# Patient Record
Sex: FEMALE | Race: WHITE | NOT HISPANIC OR LATINO
[De-identification: names, ages, dates, MRNs, and addresses within clinical notes are randomized per-mention and may not be internally consistent; named-entity substitution may affect disease eponyms.]

---

## 2020-11-11 ENCOUNTER — RESULT REVIEW (OUTPATIENT)
Age: 75
End: 2020-11-11

## 2022-10-27 ENCOUNTER — OUTPATIENT (OUTPATIENT)
Dept: OUTPATIENT SERVICES | Facility: HOSPITAL | Age: 77
LOS: 1 days | End: 2022-10-27

## 2022-10-27 ENCOUNTER — APPOINTMENT (OUTPATIENT)
Dept: ULTRASOUND IMAGING | Facility: CLINIC | Age: 77
End: 2022-10-27

## 2022-10-27 PROCEDURE — 76856 US EXAM PELVIC COMPLETE: CPT | Mod: 26

## 2022-10-27 PROCEDURE — 76830 TRANSVAGINAL US NON-OB: CPT | Mod: 26

## 2022-10-28 ENCOUNTER — TRANSCRIPTION ENCOUNTER (OUTPATIENT)
Age: 77
End: 2022-10-28

## 2022-10-28 PROBLEM — Z00.00 ENCOUNTER FOR PREVENTIVE HEALTH EXAMINATION: Status: ACTIVE | Noted: 2022-10-28

## 2022-11-03 ENCOUNTER — OUTPATIENT (OUTPATIENT)
Dept: OUTPATIENT SERVICES | Facility: HOSPITAL | Age: 77
LOS: 1 days | End: 2022-11-03

## 2022-11-03 ENCOUNTER — APPOINTMENT (OUTPATIENT)
Dept: CT IMAGING | Facility: CLINIC | Age: 77
End: 2022-11-03

## 2022-11-03 PROCEDURE — 74178 CT ABD&PLV WO CNTR FLWD CNTR: CPT | Mod: 26

## 2022-11-10 ENCOUNTER — LABORATORY RESULT (OUTPATIENT)
Age: 77
End: 2022-11-10

## 2022-11-10 ENCOUNTER — APPOINTMENT (OUTPATIENT)
Dept: UROLOGY | Facility: CLINIC | Age: 77
End: 2022-11-10

## 2022-11-10 VITALS
HEIGHT: 65.5 IN | TEMPERATURE: 97.6 F | WEIGHT: 189 LBS | OXYGEN SATURATION: 96 % | SYSTOLIC BLOOD PRESSURE: 123 MMHG | DIASTOLIC BLOOD PRESSURE: 77 MMHG | HEART RATE: 83 BPM | BODY MASS INDEX: 31.11 KG/M2

## 2022-11-10 DIAGNOSIS — R31.0 GROSS HEMATURIA: ICD-10-CM

## 2022-11-10 LAB
BILIRUB UR QL STRIP: NORMAL
CLARITY UR: CLEAR
COLLECTION METHOD: NORMAL
GLUCOSE UR-MCNC: NORMAL
HCG UR QL: 0.2 EU/DL
HGB UR QL STRIP.AUTO: NORMAL
KETONES UR-MCNC: NORMAL
LEUKOCYTE ESTERASE UR QL STRIP: NORMAL
NITRITE UR QL STRIP: NORMAL
PH UR STRIP: 6
PROT UR STRIP-MCNC: NORMAL
SP GR UR STRIP: 1.01

## 2022-11-10 PROCEDURE — 52000 CYSTOURETHROSCOPY: CPT

## 2022-11-10 PROCEDURE — 81003 URINALYSIS AUTO W/O SCOPE: CPT | Mod: QW

## 2022-11-10 PROCEDURE — 99205 OFFICE O/P NEW HI 60 MIN: CPT | Mod: 25

## 2022-11-14 LAB
ABO + RH PNL BLD: NORMAL
ALBUMIN SERPL ELPH-MCNC: 4.6 G/DL
ALP BLD-CCNC: 65 U/L
ALT SERPL-CCNC: 16 U/L
ANION GAP SERPL CALC-SCNC: 14 MMOL/L
AST SERPL-CCNC: 24 U/L
BACTERIA UR CULT: NORMAL
BASOPHILS # BLD AUTO: 0 K/UL
BASOPHILS NFR BLD AUTO: 0 %
BILIRUB SERPL-MCNC: 0.3 MG/DL
BUN SERPL-MCNC: 17 MG/DL
CALCIUM SERPL-MCNC: 9.7 MG/DL
CHLORIDE SERPL-SCNC: 104 MMOL/L
CO2 SERPL-SCNC: 24 MMOL/L
CREAT SERPL-MCNC: 0.76 MG/DL
EGFR: 81 ML/MIN/1.73M2
EOSINOPHIL # BLD AUTO: 0 K/UL
EOSINOPHIL NFR BLD AUTO: 0 %
GLUCOSE SERPL-MCNC: 93 MG/DL
HCT VFR BLD CALC: 41.2 %
HGB BLD-MCNC: 13.4 G/DL
INR PPP: 1.02 RATIO
LYMPHOCYTES # BLD AUTO: 2.5 K/UL
LYMPHOCYTES NFR BLD AUTO: 26.5 %
MAN DIFF?: NORMAL
MCHC RBC-ENTMCNC: 32.5 GM/DL
MCHC RBC-ENTMCNC: 35.4 PG
MCV RBC AUTO: 108.7 FL
MONOCYTES # BLD AUTO: 0.67 K/UL
MONOCYTES NFR BLD AUTO: 7.1 %
NEUTROPHILS # BLD AUTO: 6.27 K/UL
NEUTROPHILS NFR BLD AUTO: 66.4 %
PLATELET # BLD AUTO: 186 K/UL
POTASSIUM SERPL-SCNC: 4.3 MMOL/L
PROT SERPL-MCNC: 7.3 G/DL
PT BLD: 11.9 SEC
RBC # BLD: 3.79 M/UL
RBC # FLD: 14 %
SARS-COV-2 N GENE NPH QL NAA+PROBE: NOT DETECTED
SODIUM SERPL-SCNC: 142 MMOL/L
URINE CYTOLOGY: NORMAL
WBC # FLD AUTO: 9.45 K/UL

## 2022-11-14 NOTE — HISTORY OF PRESENT ILLNESS
[FreeTextEntry1] : Language: English\par Date of First visit: 11/10/2022\par Accompanied by: Daughter Abby\par Contact info: ***\par Referring Provider/PCP: Daniella Phillips\par \par \par \par CC/ Problem List:\par \par ===============================================================================\par FIRST VISIT:\par The patient is a 77 year female who first presents 11/10/2022 for a bladder mass and hematuria.\par \par She was having cramping around Oct 2022. She had a vaginal sonogram. There was nothing there. they could not  see her right ovary. She has had persistent gross hematuria without infection. Her urine is usually light pink. She had a CT scan on 2022. She had a bladder mass. She has some pain when she voids. She denies abnormal weight loss.\par \par She is an active. She has no h/o chemo, RT, schisto exposure, narcotic abuse, chemical exposure at work recently though when she was in the [employer] in [facility] she was exposed to DDT. \par \par She denies h/o  trauma or surgery. She is in an [facility] Lung screening study because she is a smoker\par \par ** Because of her CT findings she was added on for an UNSCHEDULED CYSTOSCOPY.**\par \par -------------------------------------------------------------------------------------------\par INTERVAL VISITS:\par \par \par ===============================================================================\par \par PMH: HLD, Right breast pre-cancer cells, coronary artery calcifications\par PSH: appy, Breast surgery\par POBH: (if applicable) \par FH: Daugter Ovarian CA, Aunt Breast CA\par \par ALL: NKDA\par MEDS: Rosuvastatin, Vit C, Vit D3, Fishoil, Oil of Oregano, ERENS -2 for eyes, Turmeric, CoQ10, B complex, Mineral complex\par SOC: Active smoker since age 25, Social ETOH, No drugs\par \par \par ROS: Review of Systems is as per HPI unless otherwise denoted below\par \par \par ===============================================================================\par DATA: \par \par LABS:-------------------------------------------------------------------------------------------------------------------\par 10/26/2022: Urine culture negative\par 10/25/2022: UA 4 WBC, 2 RBC, 1 Epi, ) Casts\par 11/10/2022: UA negative except moderate Blood\par \par \par RADS:-------------------------------------------------------------------------------------------------------------------\par 2022: CTU\par Large left lateral bladder mucosal neoplasm. She has no hydro. \par Films personally reviewed. Her Left ureter goes straight into the bladder mass It is not far from her right UO and there may be some borderline thickening in the area.\par Films reviewed with family.\par \par Right adnexal cyst for which sonographic follow-up in 6-12 months can be \par performed, as per consensus criteria.\par \par \par \par PATHOLOGY/CYTOLOGY:-------------------------------------------------------------------------------------------\par \par \par \par VOIDING STUDIES: ----------------------------------------------------------------------------------------------------\par \par \par \par STONE STUDIES: (Analysis/LLSA)----------------------------------------------------------------------------------\par \par \par \par PROCEDURES: -----------------------------------------------------------------------------------------------\par 11/10/2022: Cystoscopy:\par Her urethra was normal. She had a large solid highly abnormal mass on her left trigone covering her left UO. I could not clearly see her right UO due to bullous changes. It extends up the lateral wall and is >5cm.\par \par \par \par ===============================================================================\par \par PHYSICAL EXAM:\par \par GEN: AAOx3, NAD, Habitus: BMI 31\par \par BARRIERS to CARE: None\par \par PSYCH: Appropriate Behavior, Affect Congruent\par \par HEENT: AT/NC Trachea midline. EOMI.\par \par Lungs: No labored breathing.\par \par NEURO: + Movement, all 4 extremities grossly intact without deficits. No tremors.\par \par SKIN: Warm dry. No visible rashes or ulcers\par \par GAIT: Gait normal, Stability good\par \par  FOCUSED: -----------------------------------------------------------------------------------------------\par \par Bladder: Nondistended, Nontender, No masses\par \par Ext Genitalia: Normal\par \par Urethra: Normal, no masses, no scarring or prolapse; No hypermobility\par \par =======================================================================================\par \par \par \par ASSESSMENT and PLAN\par \par The patient is a 77 year female with a history of the following:\par \par 1. Bladder mass with hematuria:\par \par She is s/p CT that showed a large bladder mass that involves her left UO and possibly her right UO.\par Her cysto today 11/10/2022 confirmed this mass.\par \par Given the patient's cystoscopic findings, the patient was offered a Transurethral resection of bladder tumor. I explained the reasoning behind this.\par \par I explained that the procedure is done cystoscopically and without incisions. During the procedure we attempt to remove as much tumor as possible, and hopefully all of it. We do this as for diagnostic, staging and therapeutic reasons.\par \par If we need to resect the bladder in its full thickness, we may need to leave the patient with a catheter for a few days.\par \par The patient's tumor does involve the ureteral orifices and as such the patient does need ureteral stents. \par The patient's tumor does not involve significant portions of the bladder neck which may necessitate Peters catheterization after the procedure.\par The patient's tumor does involve the lateral walls and therefore the patient likely will need to be paralyzed during this procedure to allow for full resection without an obturator reflex.\par Because the patient is a female and likely has a thin bladder, she may need a peters after this procedure to allow the bladder to heal.\par \par I explained the risks of this procedure which include but are not limited to bleeding, infection, perforation, damage to the urinary tract, damage to the ureteral orifices and incontinence. If there is a perforation at the dome, the patient may need an emergent laparotomy.\par \par Given the patient's current clinic situation, the patient does not need intravesical therapy immediately after the procedure. \par \par We also discussed the natural history of this disease:\par Low grade cancers don't usually progress but certainly recur while high grade cancers can progress and often recur as well. Recurrences of bladder cancer can occur anywhere in the urethra, bladder, ureters or renal pelvis. As such, surveillance for bladder cancer patients is lifelong and involves upper and lower tract imaging/cystoscopies.\par \par The patient's questions were answered and the patient opted to book surgery. I also spoke to her PCP: she may be able to get cleared tomorrow. The pt will call her and we will draw her blood work.\par \par \par -----------------------------------------------------------------------------------------------------\par LABS/TESTS Ordered: CMP, CBC, PT/INR\par Meds Ordered:\par Follow up: Surgery\par -----------------------------------------------------------------------------------------------------\par \par The total amount of time I have personally spent preparing for this visit, reviewing the patient's test results, obtaining external history, ordering tests/medications, documenting clinical information, communicating with and counseling the patient/family and/or caregiver(s), and spent face to face with the patient explaining the above was  75 minutes.\par \par Thank you for allowing me to assist in the care of your patient. Should you have any questions please do not hesitate to reach out to me.\par \par \par Debra Sheppard MD\par Associate \par Department of Urology\par [facility]\par Phone: [phone]\par Fax: [phone]\par \par [address] [place] [address]\par [facility] [address]\par

## 2022-11-14 NOTE — ADDENDUM
[FreeTextEntry1] : 11/10/2022: Cytology atypical. All other labs normal with ; urine culture negative

## 2022-11-15 DIAGNOSIS — Z01.818 ENCOUNTER FOR OTHER PREPROCEDURAL EXAMINATION: ICD-10-CM

## 2022-11-16 LAB — SARS-COV-2 N GENE NPH QL NAA+PROBE: NOT DETECTED

## 2022-11-17 VITALS
WEIGHT: 189.16 LBS | HEART RATE: 81 BPM | SYSTOLIC BLOOD PRESSURE: 126 MMHG | HEIGHT: 65 IN | TEMPERATURE: 98 F | DIASTOLIC BLOOD PRESSURE: 70 MMHG | RESPIRATION RATE: 16 BRPM | OXYGEN SATURATION: 96 %

## 2022-11-17 NOTE — H&P ADULT - HISTORY OF PRESENT ILLNESS
77F with PMH of HLD, right breast pre-cancer cells, CAD, and hematuria, was found to have a >5cm left lateral bladder mass on her left trigone covering left UO and presents for elective TURBT. Complains of dysuria, and persistent gross hematuria without infection. Current smoker since age 25.    PMH: HLD, Right breast pre-cancer cells, coronary artery calcifications  PSH: appy, Breast surgery  POBH: (if applicable)   FH: Daugter Ovarian CA, Aunt Breast CA    ALL: NKDA  MEDS: Rosuvastatin, Vit C, Vit D3, Fishoil, Oil of Oregano, ERENS -2 for eyes, Turmeric, CoQ10, B complex, Mineral complex  SOC: Active smoker since age 25, Social ETOH, No drugs

## 2022-11-17 NOTE — ASU PATIENT PROFILE, ADULT - NSICDXPASTMEDICALHX_GEN_ALL_CORE_FT
PAST MEDICAL HISTORY:  HLD (hyperlipidemia)     Kidney cysts     Lung nodules left    JOSE C treated with BiPAP

## 2022-11-17 NOTE — ASU PATIENT PROFILE, ADULT - NSCAFFEINETYPE_GEN_ALL_CORE_SD
Identified pt with two pt identifiers(name and ). Chief Complaint   Patient presents with    New Patient     est care    Cyst     back of throat- painful if touched x1 month at least    Pain (Chronic)     help with inflamation    GERD     substitute to jeannine        Health Maintenance Due   Topic    Hepatitis C Screening     DTaP/Tdap/Td series (1 - Tdap)    Shingrix Vaccine Age 50> (1 of 2)    Bone Densitometry (Dexa) Screening     Pneumococcal 65+ years (1 of 1 - PPSV23)    Medicare Yearly Exam        Wt Readings from Last 3 Encounters:   06/15/21 227 lb (103 kg)   21 220 lb (99.8 kg)   10/28/20 224 lb (101.6 kg)     Temp Readings from Last 3 Encounters:   06/15/21 97.4 °F (36.3 °C) (Oral)   21 97.1 °F (36.2 °C)   20 97.6 °F (36.4 °C)     BP Readings from Last 3 Encounters:   06/15/21 126/72   21 118/87   10/28/20 122/84     Pulse Readings from Last 3 Encounters:   06/15/21 (!) 59   21 91   10/28/20 64         Learning Assessment:  :     Learning Assessment 6/15/2021   PRIMARY LEARNER Patient   HIGHEST LEVEL OF EDUCATION - PRIMARY LEARNER  SOME COLLEGE   BARRIERS PRIMARY LEARNER NONE   CO-LEARNER CAREGIVER No   PRIMARY LANGUAGE ENGLISH   LEARNER PREFERENCE PRIMARY DEMONSTRATION   ANSWERED BY patient   RELATIONSHIP SELF       Depression Screening:  :     3 most recent PHQ Screens 6/15/2021   Little interest or pleasure in doing things Several days   Feeling down, depressed, irritable, or hopeless Several days   Total Score PHQ 2 2       Fall Risk Assessment:  :     Fall Risk Assessment, last 12 mths 6/15/2021   Able to walk? Yes   Fall in past 12 months? 0   Do you feel unsteady? 1   Are you worried about falling 0   Is the gait abnormal? 0       Abuse Screening:  :     Abuse Screening Questionnaire 6/15/2021   Do you ever feel afraid of your partner? N   Are you in a relationship with someone who physically or mentally threatens you? N   Is it safe for you to go home?  Amaris Holley Coordination of Care Questionnaire:  :     1) Have you been to an emergency room, urgent care clinic since your last visit? yes [facility] ER 3/8/21  Hospitalized since your last visit? no             2) Have you seen or consulted any other health care providers outside of [address] [place] [address] since your last visit? yes  Dr Hermelindo Montiel- old PCP 2/2021    3) Do you have an Advance Directive on file? no  Are you interested in receiving information about Advance Directives? no    Reviewed record in preparation for visit and have obtained necessary documentation. coffee/tea

## 2022-11-17 NOTE — H&P ADULT - NSICDXPASTSURGICALHX_GEN_ALL_CORE_FT
PAST SURGICAL HISTORY:  H/O breast surgery right    S/P appendectomy     S/P colonoscopic polypectomy

## 2022-11-17 NOTE — H&P ADULT - ASSESSMENT
77F with PMH of HLD, right breast pre-cancer cells, CAD, and hematuria, was found to have a >5cm left lateral bladder mass on her left trigone covering left UO and presents for elective TURBT.    Plan:  NPO  OR

## 2022-11-17 NOTE — ASU PATIENT PROFILE, ADULT - NSICDXPASTSURGICALHX_GEN_ALL_CORE_FT
PAST SURGICAL HISTORY:  H/O breast surgery right    S/P appendectomy     S/P colonoscopic polypectomy      PAST SURGICAL HISTORY:  H/O breast surgery right- lumpectomy    S/P appendectomy     S/P colonoscopic polypectomy

## 2022-11-17 NOTE — ASU PATIENT PROFILE, ADULT - FALL HARM RISK - HARM RISK INTERVENTIONS

## 2022-11-18 ENCOUNTER — OUTPATIENT (OUTPATIENT)
Dept: OUTPATIENT SERVICES | Facility: HOSPITAL | Age: 77
LOS: 1 days | Discharge: HOME | End: 2022-11-18
Payer: MEDICARE

## 2022-11-18 ENCOUNTER — APPOINTMENT (OUTPATIENT)
Dept: UROLOGY | Facility: HOSPITAL | Age: 77
End: 2022-11-18

## 2022-11-18 ENCOUNTER — TRANSCRIPTION ENCOUNTER (OUTPATIENT)
Age: 77
End: 2022-11-18

## 2022-11-18 ENCOUNTER — RESULT REVIEW (OUTPATIENT)
Age: 77
End: 2022-11-18

## 2022-11-18 VITALS
SYSTOLIC BLOOD PRESSURE: 121 MMHG | HEART RATE: 81 BPM | RESPIRATION RATE: 20 BRPM | DIASTOLIC BLOOD PRESSURE: 79 MMHG | OXYGEN SATURATION: 95 %

## 2022-11-18 DIAGNOSIS — Z90.49 ACQUIRED ABSENCE OF OTHER SPECIFIED PARTS OF DIGESTIVE TRACT: Chronic | ICD-10-CM

## 2022-11-18 DIAGNOSIS — Z98.890 OTHER SPECIFIED POSTPROCEDURAL STATES: Chronic | ICD-10-CM

## 2022-11-18 PROBLEM — N28.1 CYST OF KIDNEY, ACQUIRED: Chronic | Status: ACTIVE | Noted: 2022-11-17

## 2022-11-18 PROBLEM — G47.33 OBSTRUCTIVE SLEEP APNEA (ADULT) (PEDIATRIC): Chronic | Status: ACTIVE | Noted: 2022-11-17

## 2022-11-18 PROBLEM — R91.8 OTHER NONSPECIFIC ABNORMAL FINDING OF LUNG FIELD: Chronic | Status: ACTIVE | Noted: 2022-11-17

## 2022-11-18 PROBLEM — E78.5 HYPERLIPIDEMIA, UNSPECIFIED: Chronic | Status: ACTIVE | Noted: 2022-11-17

## 2022-11-18 PROCEDURE — 88305 TISSUE EXAM BY PATHOLOGIST: CPT | Mod: 26

## 2022-11-18 PROCEDURE — 86901 BLOOD TYPING SEROLOGIC RH(D): CPT

## 2022-11-18 PROCEDURE — 88305 TISSUE EXAM BY PATHOLOGIST: CPT

## 2022-11-18 PROCEDURE — C1758: CPT

## 2022-11-18 PROCEDURE — 52240 CYSTOSCOPY AND TREATMENT: CPT

## 2022-11-18 PROCEDURE — 86850 RBC ANTIBODY SCREEN: CPT

## 2022-11-18 PROCEDURE — C9399: CPT

## 2022-11-18 PROCEDURE — C2617: CPT

## 2022-11-18 PROCEDURE — 52332 CYSTOSCOPY AND TREATMENT: CPT | Mod: LT

## 2022-11-18 PROCEDURE — 76000 FLUOROSCOPY <1 HR PHYS/QHP: CPT

## 2022-11-18 PROCEDURE — 86900 BLOOD TYPING SEROLOGIC ABO: CPT

## 2022-11-18 DEVICE — URETERAL CATH OPEN END 5FR 70CM
Type: IMPLANTABLE DEVICE | Status: NON-FUNCTIONAL
Removed: 2022-11-18

## 2022-11-18 DEVICE — URETERAL STENT CONTOUR 6FR 24CM
Type: IMPLANTABLE DEVICE | Status: NON-FUNCTIONAL
Removed: 2022-11-18

## 2022-11-18 RX ORDER — TAMSULOSIN HYDROCHLORIDE 0.4 MG/1
1 CAPSULE ORAL
Qty: 30 | Refills: 0
Start: 2022-11-18 | End: 2022-12-17

## 2022-11-18 RX ORDER — ONDANSETRON 8 MG/1
4 TABLET, FILM COATED ORAL ONCE
Refills: 0 | Status: DISCONTINUED | OUTPATIENT
Start: 2022-11-18 | End: 2022-11-18

## 2022-11-18 RX ORDER — ROSUVASTATIN CALCIUM 5 MG/1
1 TABLET ORAL
Qty: 0 | Refills: 0 | DISCHARGE

## 2022-11-18 RX ORDER — ACETAMINOPHEN 500 MG
1000 TABLET ORAL ONCE
Refills: 0 | Status: COMPLETED | OUTPATIENT
Start: 2022-11-18 | End: 2022-11-18

## 2022-11-18 RX ORDER — KETOROLAC TROMETHAMINE 30 MG/ML
15 SYRINGE (ML) INJECTION ONCE
Refills: 0 | Status: DISCONTINUED | OUTPATIENT
Start: 2022-11-18 | End: 2022-11-18

## 2022-11-18 RX ORDER — PHENAZOPYRIDINE HCL 100 MG
1 TABLET ORAL
Qty: 9 | Refills: 0
Start: 2022-11-18 | End: 2022-11-20

## 2022-11-18 RX ORDER — SODIUM CHLORIDE 9 MG/ML
1000 INJECTION, SOLUTION INTRAVENOUS
Refills: 0 | Status: DISCONTINUED | OUTPATIENT
Start: 2022-11-18 | End: 2022-11-18

## 2022-11-18 RX ORDER — PHENAZOPYRIDINE HCL 100 MG
100 TABLET ORAL ONCE
Refills: 0 | Status: DISCONTINUED | OUTPATIENT
Start: 2022-11-18 | End: 2022-11-18

## 2022-11-18 RX ORDER — TAMSULOSIN HYDROCHLORIDE 0.4 MG/1
1 CAPSULE ORAL
Qty: 14 | Refills: 0
Start: 2022-11-18 | End: 2022-12-01

## 2022-11-18 RX ADMIN — Medication 400 MILLIGRAM(S): at 10:56

## 2022-11-18 RX ADMIN — Medication 1000 MILLIGRAM(S): at 11:15

## 2022-11-18 RX ADMIN — Medication 15 MILLIGRAM(S): at 11:15

## 2022-11-18 RX ADMIN — Medication 15 MILLIGRAM(S): at 10:56

## 2022-11-18 NOTE — ASU DISCHARGE PLAN (ADULT/PEDIATRIC) - CARE PROVIDER_API CALL
Debra Sheppard)  Urology  [address] [place] [address]  Phone: [phone]  Fax: [phone]  Scheduled Appointment: 11/22/2022

## 2022-11-18 NOTE — BRIEF OPERATIVE NOTE - NSICDXBRIEFPROCEDURE_GEN_ALL_CORE_FT
PROCEDURES:  Excision, tumor, bladder 18-Nov-2022 10:27:04  Debra Sheppard  Insertion, stent, ureter, cystoscopic 18-Nov-2022 11:21:49  Debra Sheppard

## 2022-11-18 NOTE — ASU DISCHARGE PLAN (ADULT/PEDIATRIC) - NS MD DC FALL RISK RISK
For information on Fall & Injury Prevention, visit: [email or web address] OR  [email or web address] OR  https://www.cdc.gov/steadi/patient.html

## 2022-11-18 NOTE — BRIEF OPERATIVE NOTE - OPERATION/FINDINGS
Large solid, partially necrotic bladder tumor >6cm along the left lateral wall and trigone up to the left UO

## 2022-11-22 ENCOUNTER — APPOINTMENT (OUTPATIENT)
Dept: UROLOGY | Facility: CLINIC | Age: 77
End: 2022-11-22

## 2022-11-22 VITALS
DIASTOLIC BLOOD PRESSURE: 86 MMHG | TEMPERATURE: 97.8 F | HEART RATE: 95 BPM | SYSTOLIC BLOOD PRESSURE: 128 MMHG | OXYGEN SATURATION: 95 %

## 2022-11-22 LAB — SURGICAL PATHOLOGY STUDY: SIGNIFICANT CHANGE UP

## 2022-11-22 PROCEDURE — 99213 OFFICE O/P EST LOW 20 MIN: CPT

## 2022-11-22 NOTE — HISTORY OF PRESENT ILLNESS
[FreeTextEntry1] : Language: English\par Date of First visit: 11/10/2022\par Accompanied by: Daughter Abby\par Contact info: ***\par Referring Provider/PCP: \paddy Coley\par [phone]\par \par Daniella Wolfe\par [phone]\par \par \par \par CC/ Problem List:\par \par ===============================================================================\par FIRST VISIT:\par The patient was a 77 year female who first presented on 11/10/2022 for a bladder mass and hematuria.\par \par She was having cramping around Oct 2022. She had a vaginal sonogram. There was nothing there. they could not  see her right ovary. She has had persistent gross hematuria without infection. Her urine is usually light pink. She had a CT scan on 2022. She had a bladder mass. She has some pain when she voids. She denies abnormal weight loss.\par \par She is an active. She has no h/o chemo, RT, schisto exposure, narcotic abuse, chemical exposure at work recently though when she was in the [employer] in [facility] she was exposed to DDT. \par \par She denies h/o  trauma or surgery. She is in an [facility] Lung screening study because she is a smoker\par \par ** Because of her CT findings she was added on for an UNSCHEDULED CYSTOSCOPY.**\par \par -------------------------------------------------------------------------------------------\par INTERVAL VISITS:\par \par On 2022 she underwent a very large TURBT with left ureteral stent.\par She tolerated the procedure well but I am worried her disease is muscle invasive.\par The patient's age today 2022 is 77 year old.\par Please note interval events and changes in PMH, PSH, MEDS and ALLERGIES were reviewed.\par She presents today 2022 for a TOV.\par She has been doing well. Her urine has been dark yellow.\par \par \par ===============================================================================\par \par PMH: HLD, Right breast pre-cancer cells, coronary artery calcifications\par PSH: appy, Breast surgery\par POBH: (if applicable) \par FH: Daugter Ovarian CA, Aunt Breast CA\par \par ALL: NKDA\par MEDS: Rosuvastatin, Vit C, Vit D3, Fishoil, Oil of Oregano, ERENS -2 for eyes, Turmeric, CoQ10, B complex, Mineral complex\par SOC: Active smoker since age 25, Social ETOH, No drugs\par \par \par ROS: Review of Systems is as per HPI unless otherwise denoted below\par \par \par ===============================================================================\par DATA: \par \par LABS:-------------------------------------------------------------------------------------------------------------------\par 10/26/2022: Urine culture negative\par 10/25/2022: UA 4 WBC, 2 RBC, 1 Epi, ) Casts\par 11/10/2022: UA negative except moderate Blood\par \par \par RADS:-------------------------------------------------------------------------------------------------------------------\par 2022: CTU\par Large left lateral bladder mucosal neoplasm. She has no hydro. \par Films personally reviewed. Her Left ureter goes straight into the bladder mass It is not far from her right UO and there may be some borderline thickening in the area.\par Films reviewed with family.\par \par Right adnexal cyst for which sonographic follow-up in 6-12 months can be \par performed, as per consensus criteria.\par \par \par \par PATHOLOGY/CYTOLOGY:-------------------------------------------------------------------------------------------\par \par \par \par VOIDING STUDIES: ----------------------------------------------------------------------------------------------------\par 2022: She passed her TOV today with a PVR 0cc\par She was given one dose Cefuroxime after\par \par \par STONE STUDIES: (Analysis/LLSA)----------------------------------------------------------------------------------\par \par \par \par PROCEDURES: -----------------------------------------------------------------------------------------------\par 11/10/2022: Cystoscopy:\par Her urethra was normal. She had a large solid highly abnormal mass on her left trigone covering her left UO. I could not clearly see her right UO due to bullous changes. It extends up the lateral wall and is >5cm.\par \par \par \par ===============================================================================\par \par PHYSICAL EXAM:\par \par GEN: AAOx3, NAD, Habitus: BMI 31\par \par BARRIERS to CARE: None\par \par PSYCH: Appropriate Behavior, Affect Congruent\par \par HEENT: AT/NC Trachea midline. EOMI.\par \par Lungs: No labored breathing.\par \par NEURO: + Movement, all 4 extremities grossly intact without deficits. No tremors.\par \par SKIN: Warm dry. No visible rashes or ulcers\par \par GAIT: Gait normal, Stability good\par \par =======================================================================================\par \par \par \par ASSESSMENT and PLAN\par \par The patient is a 77 year female with a history of the following:\par \par 1. Bladder mass with hematuria:\par \par She is s/p CT that showed a large bladder mass that involves her left UO and possibly her right UO.\par Her cysto 11/10/2022 confirmed this mass.\par On 2022 she underwent a very large TURBT with left ureteral stent.\par She passed her TOV today 2022 and was given Cefuroxime x1. \par \par She will RTC next Thursday for a stent removal and for a 30 minute visit to discuss her path.\par \par \par -----------------------------------------------------------------------------------------------------\par LABS/TESTS Ordered: \par Meds Ordered:\par Follow up: next Thursday, stent removal, 30 min visit to discuss path\par -----------------------------------------------------------------------------------------------------\par \par The total amount of time I have personally spent preparing for this visit, reviewing the patient's test results, obtaining external history, ordering tests/medications, documenting clinical information, communicating with and counseling the patient/family and/or caregiver(s), and spent face to face with the patient explaining the above was  25 minutes.\par \par Thank you for allowing me to assist in the care of your patient. Should you have any questions please do not hesitate to reach out to me.\par \par \par Debra Sheppard MD\[facility] Associate \[facility] Department of Urology\[facility] Phone: [phone]\[facility] Fax: [phone]\[facility] \[facility] [address] [address] [place]\[facility] [address]\[facility]

## 2022-11-29 ENCOUNTER — APPOINTMENT (OUTPATIENT)
Dept: UROLOGY | Facility: CLINIC | Age: 77
End: 2022-11-29

## 2022-11-29 VITALS
SYSTOLIC BLOOD PRESSURE: 134 MMHG | HEART RATE: 88 BPM | DIASTOLIC BLOOD PRESSURE: 83 MMHG | OXYGEN SATURATION: 94 % | TEMPERATURE: 97.4 F

## 2022-11-29 DIAGNOSIS — C67.8 MALIGNANT NEOPLASM OF OVERLAPPING SITES OF BLADDER: ICD-10-CM

## 2022-11-29 LAB
BILIRUB UR QL STRIP: NORMAL
CLARITY UR: CLEAR
COLLECTION METHOD: NORMAL
GLUCOSE UR-MCNC: NORMAL
HCG UR QL: 0.2 EU/DL
HGB UR QL STRIP.AUTO: NORMAL
KETONES UR-MCNC: NORMAL
LEUKOCYTE ESTERASE UR QL STRIP: NORMAL
NITRITE UR QL STRIP: NORMAL
PH UR STRIP: 5.5
PROT UR STRIP-MCNC: NORMAL
SP GR UR STRIP: 1.01

## 2022-11-29 PROCEDURE — 99215 OFFICE O/P EST HI 40 MIN: CPT | Mod: 25

## 2022-11-29 PROCEDURE — 52310 CYSTOSCOPY AND TREATMENT: CPT

## 2022-11-29 PROCEDURE — 81003 URINALYSIS AUTO W/O SCOPE: CPT | Mod: QW

## 2022-12-02 ENCOUNTER — OUTPATIENT (OUTPATIENT)
Dept: OUTPATIENT SERVICES | Facility: HOSPITAL | Age: 77
LOS: 1 days | End: 2022-12-02

## 2022-12-02 ENCOUNTER — APPOINTMENT (OUTPATIENT)
Dept: CT IMAGING | Facility: CLINIC | Age: 77
End: 2022-12-02

## 2022-12-02 ENCOUNTER — RESULT REVIEW (OUTPATIENT)
Age: 77
End: 2022-12-02

## 2022-12-02 DIAGNOSIS — Z98.890 OTHER SPECIFIED POSTPROCEDURAL STATES: Chronic | ICD-10-CM

## 2022-12-02 DIAGNOSIS — Z90.49 ACQUIRED ABSENCE OF OTHER SPECIFIED PARTS OF DIGESTIVE TRACT: Chronic | ICD-10-CM

## 2022-12-02 PROCEDURE — 71250 CT THORAX DX C-: CPT | Mod: 26

## 2022-12-20 ENCOUNTER — APPOINTMENT (OUTPATIENT)
Dept: UROLOGY | Facility: CLINIC | Age: 77
End: 2022-12-20

## 2022-12-20 VITALS
SYSTOLIC BLOOD PRESSURE: 111 MMHG | OXYGEN SATURATION: 97 % | TEMPERATURE: 97.5 F | HEART RATE: 93 BPM | DIASTOLIC BLOOD PRESSURE: 72 MMHG

## 2022-12-20 PROCEDURE — 99213 OFFICE O/P EST LOW 20 MIN: CPT

## 2022-12-20 NOTE — HISTORY OF PRESENT ILLNESS
[FreeTextEntry1] : Language: English\par Date of First visit: 11/10/2022\par Accompanied by: Daughter Abby\par Contact info: ***\par Referring Provider/PCP: \paddy Coley\par [phone]\par \par Daniella Wolfe\par [phone]\par \par \par \par \par CC/ Problem List:\par \par ===============================================================================\par FIRST VISIT:\par The patient was a 77 year female who first presented on 11/10/2022 for a bladder mass and hematuria.\par \par She was having cramping around Oct 2022. She had a vaginal sonogram. There was nothing there. they could not  see her right ovary. She has had persistent gross hematuria without infection. Her urine is usually light pink. She had a CT scan on 2022. She had a bladder mass. She has some pain when she voids. She denies abnormal weight loss.\par \par She is an active. She has no h/o chemo, RT, schisto exposure, narcotic abuse, chemical exposure at work recently though when she was in the [employer] in [facility] she was exposed to DDT. \par \par She denies h/o  trauma or surgery. She is in an [facility] Lung screening study because she is a smoker\par \par \par -------------------------------------------------------------------------------------------\par INTERVAL VISITS:\par \par On 2022 she underwent a very large TURBT with left ureteral stent.\par She passed her TOV on 2022. \par We removed her stent on 2022. 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 She has a PET scan next week and is supposed to start chemo, though she does not yet know what she is getting. She is still trying to get her pathology slides. She has an appt with a Urologist Dr. Oliva there.\par \par \par ===============================================================================\par \par PMH: HLD, Right breast pre-cancer cells, coronary artery calcifications\par PSH: appy, Breast surgery\par POBH: (if applicable) \par FH: Daughter Ovarian CA, Aunt Breast CA\par \par ALL: NKDA\par MEDS: Rosuvastatin, Vit C, Vit D3, Fishoil, Oil of Oregano, ERENS -2 for eyes, Turmeric, CoQ10, B complex, Mineral complex\par SOC: Active smoker since age 25, Social ETOH, No drugs\par \par \par ROS: Review of Systems is as per HPI unless otherwise denoted below\par \par \par ===============================================================================\par DATA: \par \par LABS:-------------------------------------------------------------------------------------------------------------------\par 10/26/2022: Urine culture negative\par 10/25/2022: UA 4 WBC, 2 RBC, 1 Epi, ) Casts\par 11/10/2022: UA negative except moderate Blood\par 2022: CMP normal with normal Alk phos, sCre 0.76\par \par \par RADS:-------------------------------------------------------------------------------------------------------------------\par 2022: CTU\par Large left lateral bladder mucosal neoplasm. She has no hydro. \par Films personally reviewed. Her Left ureter goes straight into the bladder mass It is not far from her right UO and there may be some borderline thickening in the area.\par Films reviewed with family.\par \par Right adnexal cyst for which sonographic follow-up in 6-12 months can be \par performed, as per consensus criteria.\par \par 2022: CT chest\par No evidence of mets\par \par PATHOLOGY/CYTOLOGY:-------------------------------------------------------------------------------------------\par 2022: TURBT pathology\par 1. Bladder tumor deep margin\par High Grade TCC infilgrates into muscle (T2)\par 2. Bladder tumor #1: \par HG TCC, into LP, no muscle seen, (T1)\par 3. Bladder Tumor #2: \par HG TCC infiltrates into muscle, (T2)\par \par \par VOIDING STUDIES: ----------------------------------------------------------------------------------------------------\par 2022: She passed her TOV today with a PVR 0cc\par She was given one dose Cefuroxime after\par \par \par STONE STUDIES: (Analysis/LLSA)----------------------------------------------------------------------------------\par \par \par \par PROCEDURES: -----------------------------------------------------------------------------------------------\par 11/10/2022: Cystoscopy:\par Her urethra was normal. She had a large solid highly abnormal mass on her left trigone covering her left UO. I could not clearly see her right UO due to bullous changes. It extends up the lateral wall and is >5cm.\par \par 2022: Cystoscopy and LEft Stent removal\par \par ===============================================================================\par \par PHYSICAL EXAM:\par \par GEN: AAOx3, NAD, Habitus: BMI 31\par \par BARRIERS to CARE: None\par \par PSYCH: Appropriate Behavior, Affect Congruent\par \par HEENT: AT/NC Trachea midline. EOMI.\par \par Lungs: No labored breathing.\par \par NEURO: + Movement, all 4 extremities grossly intact without deficits. No tremors.\par \par SKIN: Warm dry. No visible rashes or ulcers\par \par GAIT: Gait normal, Stability good\par \par =======================================================================================\par \par \par \par ASSESSMENT and PLAN\par \par The patient is a 77 year female with a history of the following:\par \par 1. High Grade at least T2 TCC\par \par She is s/p CT that showed a large bladder mass that involves her left UO and possibly her right UO.\par Her cysto 11/10/2022 confirmed this mass.\par On 2022 she underwent a very large TURBT with left ureteral stent.\par She passed her TOV 2022 and was given Cefuroxime x1. \par We removed her stent 2022.\par \par Her CT chest was negative. She is having a PET scan next week and is starting chemo with [facility] next week potentially.\par She will likely continue her care there, but has asked to keep me in the loop. She knows she can contact me at any time for anything. I did email [place] to see if her path slides were back.\par \par \par \par -----------------------------------------------------------------------------------------------------\par LABS/TESTS Ordered: \par Meds Ordered:\par Follow up: PRN\par -----------------------------------------------------------------------------------------------------\par \par The total amount of time I have personally spent preparing for this visit, reviewing the patient's test results, obtaining external history, ordering tests/medications, documenting clinical information, communicating with and counseling the patient/family and/or caregiver(s), and spent face to face with the patient explaining the above was  25 minutes.\[facility] \[facility] Thank you for allowing me to assist in the care of your patient. Should you have any questions please do not hesitate to reach out to me.\par \[facility] \[facility] Debra Sheppard MD\[facility] Associate [facility] Department of Urology\[facility] Phone: [phone]\[facility] Fax: [phone]\[facility] \[facility] [address]\[facility] [address]\[facility] \[facility] \[facility] \[facility] \[facility] Appendix A:\[facility] Staging and Risk Stratification of Bladder Cancer with Metastatic Workup (AUA/NCCN Version 6.2020)\[facility] \[facility] **************AUA RISK STRATIFICATION FOR NMIBC******************************************\par --------------------------------------------------------------------------------------------------------\par LOW RISK: \par A.  LG solitary Ta <= 3cm\par B.  PUNLMP\par --------------------------------------------------------------------------------------------------------\par INTERMEDIATE RISK\par A.  Recurrence within 1 year, LG Ta\par B.  Solitary LG Ta >3cm\par C.  Multifocal LG Ta\par --------------------------------------------------------------------------------------------------------\par HIGH RISK\par A.  HG T1\par B.  Any recurrent HG Ta\par C.  HG Ta >3cm or multifocal\par D.  Any CIS\par E.  Any BCG failure in a HG patient\par F.  Any variant histology\par G.  Any Lymphovascular invasion\par H.  Any HG prostatic urethral involvement\par -------------------------------------------------------------------------------------------------------\par \par \par ***************BLADDER CANCER STAGING****************************************************\par ------------------------------------------------------------------------------------------------------\par Tx:  Can't be assessed                 |\par Ta:  Non-invasive, no lamina propria invasion\par Tis: Carcinoma in situ\par T1:  Invades Lamina Propria\par ------------------------------------------------------------------------------------------------------\par T2:  Invades muscularis propria\par        T2a: Invades inner half of muscularis\par        T2b: Invades outer half of muscularis propria\par \par T3:  Invades Perivesical fat\par        T3a: Microscopic fat invasion\par        T3b: Macroscopic fat invasion\par \par T4:  Tumor invades prostate, uterus, vagina, pelvic or abdominal wall\par        T4a: Tumor invades adjacent organs (uterus, ovaries, prostate)\par        T4b: Tumor invades pelvic or abdominal wall\par -----------------------------------------------------------------------------------------------------\par

## 2025-04-28 ENCOUNTER — APPOINTMENT (OUTPATIENT)
Dept: OPHTHALMOLOGY | Facility: CLINIC | Age: 80
End: 2025-04-28
Payer: MEDICARE

## 2025-04-28 ENCOUNTER — NON-APPOINTMENT (OUTPATIENT)
Age: 80
End: 2025-04-28

## 2025-04-28 PROCEDURE — 92083 EXTENDED VISUAL FIELD XM: CPT

## 2025-04-28 PROCEDURE — 92134 CPTRZ OPH DX IMG PST SGM RTA: CPT

## 2025-04-28 PROCEDURE — 92004 COMPRE OPH EXAM NEW PT 1/>: CPT

## 2025-05-05 ENCOUNTER — APPOINTMENT (OUTPATIENT)
Dept: MRI IMAGING | Facility: HOSPITAL | Age: 80
End: 2025-05-05
Payer: MEDICARE

## 2025-05-05 ENCOUNTER — RESULT REVIEW (OUTPATIENT)
Age: 80
End: 2025-05-05

## 2025-05-05 ENCOUNTER — OUTPATIENT (OUTPATIENT)
Dept: OUTPATIENT SERVICES | Facility: HOSPITAL | Age: 80
LOS: 1 days | End: 2025-05-05

## 2025-05-05 DIAGNOSIS — Z90.49 ACQUIRED ABSENCE OF OTHER SPECIFIED PARTS OF DIGESTIVE TRACT: Chronic | ICD-10-CM

## 2025-05-05 DIAGNOSIS — Z98.890 OTHER SPECIFIED POSTPROCEDURAL STATES: Chronic | ICD-10-CM

## 2025-05-05 PROCEDURE — 70553 MRI BRAIN STEM W/O & W/DYE: CPT | Mod: 26

## 2025-05-05 PROCEDURE — 70553 MRI BRAIN STEM W/O & W/DYE: CPT

## 2025-05-05 PROCEDURE — A9585: CPT

## 2025-05-08 ENCOUNTER — NON-APPOINTMENT (OUTPATIENT)
Age: 80
End: 2025-05-08

## 2025-05-08 ENCOUNTER — APPOINTMENT (OUTPATIENT)
Dept: OPHTHALMOLOGY | Facility: CLINIC | Age: 80
End: 2025-05-08
Payer: MEDICARE

## 2025-05-08 PROCEDURE — 99212 OFFICE O/P EST SF 10 MIN: CPT | Mod: 93

## 2025-08-11 ENCOUNTER — APPOINTMENT (OUTPATIENT)
Dept: NEUROLOGY | Facility: CLINIC | Age: 80
End: 2025-08-11

## 2025-09-10 ENCOUNTER — APPOINTMENT (OUTPATIENT)
Dept: NEUROLOGY | Facility: CLINIC | Age: 80
End: 2025-09-10

## (undated) DEVICE — VENODYNE/SCD SLEEVE CALF MEDIUM

## (undated) DEVICE — TUBING TUR 2 PRONG

## (undated) DEVICE — ELCTR PLASMA BUTTON OVAL 24FR 12-30 DEG

## (undated) DEVICE — ELCTR PLASMA LOOP MEDIUM ANGLED 24FR 12-30 DEG

## (undated) DEVICE — GLV 7.5 PROTEXIS (WHITE)

## (undated) DEVICE — WARMING BLANKET UPPER ADULT

## (undated) DEVICE — POSITIONER FOAM EGG CRATE ULNAR 2PCS (PINK)

## (undated) DEVICE — PACK CYSTO

## (undated) DEVICE — UROVAC

## (undated) DEVICE — SOL IRR BAG NS 0.9% 3000ML